# Patient Record
Sex: FEMALE | Race: WHITE | Employment: STUDENT | URBAN - METROPOLITAN AREA
[De-identification: names, ages, dates, MRNs, and addresses within clinical notes are randomized per-mention and may not be internally consistent; named-entity substitution may affect disease eponyms.]

---

## 2017-05-04 ENCOUNTER — OFFICE VISIT (OUTPATIENT)
Dept: URGENT CARE | Facility: URGENT CARE | Age: 27
End: 2017-05-04
Payer: COMMERCIAL

## 2017-05-04 VITALS
WEIGHT: 147 LBS | RESPIRATION RATE: 20 BRPM | HEART RATE: 92 BPM | OXYGEN SATURATION: 97 % | DIASTOLIC BLOOD PRESSURE: 80 MMHG | BODY MASS INDEX: 26.89 KG/M2 | SYSTOLIC BLOOD PRESSURE: 119 MMHG | TEMPERATURE: 100.2 F

## 2017-05-04 DIAGNOSIS — N10 ACUTE PYELONEPHRITIS: Primary | ICD-10-CM

## 2017-05-04 DIAGNOSIS — R30.0 DYSURIA: ICD-10-CM

## 2017-05-04 DIAGNOSIS — R82.90 NONSPECIFIC FINDING ON EXAMINATION OF URINE: ICD-10-CM

## 2017-05-04 LAB
ALBUMIN UR-MCNC: 30 MG/DL
APPEARANCE UR: ABNORMAL
BACTERIA #/AREA URNS HPF: ABNORMAL /HPF
BILIRUB UR QL STRIP: NEGATIVE
COLOR UR AUTO: YELLOW
GLUCOSE UR STRIP-MCNC: NEGATIVE MG/DL
HGB UR QL STRIP: ABNORMAL
KETONES UR STRIP-MCNC: 15 MG/DL
LEUKOCYTE ESTERASE UR QL STRIP: ABNORMAL
NITRATE UR QL: POSITIVE
NON-SQ EPI CELLS #/AREA URNS LPF: ABNORMAL /LPF
PH UR STRIP: 5.5 PH (ref 5–7)
RBC #/AREA URNS AUTO: ABNORMAL /HPF (ref 0–2)
SP GR UR STRIP: >1.03 (ref 1–1.03)
URN SPEC COLLECT METH UR: ABNORMAL
UROBILINOGEN UR STRIP-ACNC: 0.2 EU/DL (ref 0.2–1)
WBC #/AREA URNS AUTO: ABNORMAL /HPF (ref 0–2)

## 2017-05-04 PROCEDURE — 87086 URINE CULTURE/COLONY COUNT: CPT | Performed by: FAMILY MEDICINE

## 2017-05-04 PROCEDURE — 99203 OFFICE O/P NEW LOW 30 MIN: CPT | Mod: 25 | Performed by: FAMILY MEDICINE

## 2017-05-04 PROCEDURE — 87088 URINE BACTERIA CULTURE: CPT | Performed by: FAMILY MEDICINE

## 2017-05-04 PROCEDURE — 81001 URINALYSIS AUTO W/SCOPE: CPT

## 2017-05-04 PROCEDURE — 2894A VOIDCORRECT: CPT | Mod: 91 | Performed by: FAMILY MEDICINE

## 2017-05-04 PROCEDURE — 87186 SC STD MICRODIL/AGAR DIL: CPT | Performed by: FAMILY MEDICINE

## 2017-05-04 PROCEDURE — 96372 THER/PROPH/DIAG INJ SC/IM: CPT | Performed by: FAMILY MEDICINE

## 2017-05-04 RX ORDER — CEFTRIAXONE 1 G/1
1000 INJECTION, POWDER, FOR SOLUTION INTRAMUSCULAR; INTRAVENOUS ONCE
Qty: 10 ML | Refills: 0 | OUTPATIENT
Start: 2017-05-04 | End: 2017-05-04

## 2017-05-04 RX ORDER — CEFUROXIME AXETIL 500 MG/1
500 TABLET ORAL 2 TIMES DAILY
Qty: 20 TABLET | Refills: 0 | Status: SHIPPED | OUTPATIENT
Start: 2017-05-04

## 2017-05-04 NOTE — PATIENT INSTRUCTIONS
"  Kidney Infection (Adult, Female)    An infection of the kidney is also called \"pyelonephritis.\" It usually starts as a bladder infection (\"cystitis\") that spreads to the kidneys. Pyelonephritis is more serious than a bladder infection. It can cause severe illness if not treated properly.  The usual symptoms include an aching pain in the back, side, or lower abdomen. Other symptoms may include fever, chills, nausea, vomiting, blood in the urine, an urge to urinate, and a burning sensation when urinating. Treatment is usually oral antibiotics, or in more severe cases, intramuscular or IV antibiotics. These are started right away and may be changed once urine culture results determine the infecting organisms.  Home care  The following are general care guidelines:  1. Stay home from work or school. Rest in bed until your fever breaks and you are feeling better.  2. Drink lots of fluid (at least 6 to 8 glasses a day, unless you must restrict fluids for other medical reasons). This will force the medicine into your urinary system and flush the bacteria out of your body.  3. Avoid sex until you have finished all of your medicine and your symptoms are gone.  4. Avoid caffeine, alcohol, and spicy foods. These foods may irritate the kidney and bladder.  5. You may use acetaminophen or ibuprofen to control pain, unless another pain medicine was prescribed. [NOTE: If you have chronic liver or kidney disease or ever had a stomach ulcer or GI bleeding, talk with your doctor before using these medicines.]  Follow-up care  Follow up with your doctor or as advised by our staff for a repeat urine test in 10 days. This will ensure that your infection is fully cleared.  [NOTE: If you had an X-ray, CT scan, or other diagnostic test, it will be reviewed by a specialist. You will be notified of any new findings that may affect your care.]  When to seek medical care  Get prompt medical attention if any of the following occur:    Fever " over 100.4 F (38.0 C) after 48 hours of treatment    No improvement by the third day of treatment    Increasing back or abdominal pain    Repeated vomiting or inability to take oral medicine    Weakness, dizziness, or fainting    0506-5907 [employer]. [address] [place] [address], [place] [address]. All rights reserved. This information is not intended as a substitute for professional medical care. Always follow your healthcare professional's instructions.

## 2017-05-04 NOTE — MR AVS SNAPSHOT
"              After Visit Summary   5/4/2017    Lucie Merida    MRN: [id number]           Patient Information     Date Of Birth          1990        Visit Information        Provider Department      5/4/2017 6:00 PM Mandi Chavis MD [facility]        Today's Diagnoses     Acute pyelonephritis    -  1    Nonspecific finding on examination of urine        Dysuria          Care Instructions      Kidney Infection (Adult, Female)    An infection of the kidney is also called \"pyelonephritis.\" It usually starts as a bladder infection (\"cystitis\") that spreads to the kidneys. Pyelonephritis is more serious than a bladder infection. It can cause severe illness if not treated properly.  The usual symptoms include an aching pain in the back, side, or lower abdomen. Other symptoms may include fever, chills, nausea, vomiting, blood in the urine, an urge to urinate, and a burning sensation when urinating. Treatment is usually oral antibiotics, or in more severe cases, intramuscular or IV antibiotics. These are started right away and may be changed once urine culture results determine the infecting organisms.  Home care  The following are general care guidelines:  1. Stay home from work or school. Rest in bed until your fever breaks and you are feeling better.  2. Drink lots of fluid (at least 6 to 8 glasses a day, unless you must restrict fluids for other medical reasons). This will force the medicine into your urinary system and flush the bacteria out of your body.  3. Avoid sex until you have finished all of your medicine and your symptoms are gone.  4. Avoid caffeine, alcohol, and spicy foods. These foods may irritate the kidney and bladder.  5. You may use acetaminophen or ibuprofen to control pain, unless another pain medicine was prescribed. [NOTE: If you have chronic liver or kidney disease or ever had a stomach ulcer or GI bleeding, talk with your doctor before using these " medicines.]  Follow-up care  Follow up with your doctor or as advised by our staff for a repeat urine test in 10 days. This will ensure that your infection is fully cleared.  [NOTE: If you had an X-ray, CT scan, or other diagnostic test, it will be reviewed by a specialist. You will be notified of any new findings that may affect your care.]  When to seek medical care  Get prompt medical attention if any of the following occur:    Fever over 100.4 F (38.0 C) after 48 hours of treatment    No improvement by the third day of treatment    Increasing back or abdominal pain    Repeated vomiting or inability to take oral medicine    Weakness, dizziness, or fainting    9144-3686 [employer] [address] [place] [address], [place] [address]. All rights reserved. This information is not intended as a substitute for professional medical care. Always follow your healthcare professional's instructions.              Follow-ups after your visit        Follow-up notes from your care team     Return if symptoms worsen or fail to improve.      Who to contact     If you have questions or need follow up information about today's clinic visit or your schedule please contact [facility] directly at [phone].  Normal or non-critical lab and imaging results will be communicated to you by [employer], letter or phone within 4 business days after the clinic has received the results. If you do not hear from us within 7 days, please contact the clinic through [employer] or phone. If you have a critical or abnormal lab result, we will notify you by phone as soon as possible.  Submit refill requests through [employer] or call your pharmacy and they will forward the refill request to us. Please allow 3 business days for your refill to be completed.          Additional Information About Your Visit        [employer] Information     [employer] lets you send messages to your doctor, view your test results, renew your prescriptions,  "schedule appointments and more. To sign up, go to [email or web address] . Click on \"Log in\" on the left side of the screen, which will take you to the Welcome page. Then click on \"Sign up Now\" on the right side of the page.     You will be asked to enter the access code listed below, as well as some personal information. Please follow the directions to create your username and password.     Your access code is: [email or web address]  Expires: 2017  6:33 PM     Your access code will  in 90 days. If you need help or a new code, please call your [facility] clinic or [phone].        Care EveryWhere ID     This is your Care EveryWhere ID. This could be used by other organizations to access your [facility] medical records  [id number]        Your Vitals Were     Pulse Temperature Respirations Pulse Oximetry Breastfeeding? BMI (Body Mass Index)    92 100.2  F (37.9  C) (Oral) 20 97% No 26.89 kg/m2       Blood Pressure from Last 3 Encounters:   17 119/80   13 (!) 135/96   13 125/78    Weight from Last 3 Encounters:   17 147 lb (66.7 kg)   13 120 lb (54.4 kg)   13 120 lb (54.4 kg)              We Performed the Following     CEFTRIAXONE NA INJ /250MG     INJECTION INTRAMUSCULAR OR SUB-Q     UA reflex to Microscopic and Culture     Urine Culture Aerobic Bacterial     Urine Microscopic          Today's Medication Changes          These changes are accurate as of: 17  6:33 PM.  If you have any questions, ask your nurse or doctor.               Start taking these medicines.        Dose/Directions    cefTRIAXone 1 GM vial   Commonly known as:  ROCEPHIN   Used for:  Acute pyelonephritis   Started by:  Mandi Chavis MD        Dose:  1000 mg   Inject 1 g (1,000 mg) into the muscle once for 1 dose   Quantity:  10 mL   Refills:  0       cefuroxime 500 MG tablet   Commonly known as:  CEFTIN   Used for:  Acute pyelonephritis   Started by:  Mandi Chavis MD     "    Dose:  500 mg   Take 1 tablet (500 mg) by mouth 2 times daily   Quantity:  20 tablet   Refills:  0            Where to get your medicines      These medications were sent to [facility] - [place], [place] - [address]  [address], [place] [address]     Phone:  [phone]     cefuroxime 500 MG tablet         Some of these will need a paper prescription and others can be bought over the counter.  Ask your nurse if you have questions.     You don't need a prescription for these medications     cefTRIAXone 1 GM vial                Primary Care Provider    None       No address on file        Thank you!     Thank you for choosing [facility]  for your care. Our goal is always to provide you with excellent care. Hearing back from our patients is one way we can continue to improve our services. Please take a few minutes to complete the written survey that you may receive in the mail after your visit with us. Thank you!             Your Updated Medication List - Protect others around you: Learn how to safely use, store and throw away your medicines at www.disposemymeds.org.          This list is accurate as of: 5/4/17  6:33 PM.  Always use your most recent med list.                   Brand Name Dispense Instructions for use    cefTRIAXone 1 GM vial    ROCEPHIN    10 mL    Inject 1 g (1,000 mg) into the muscle once for 1 dose       cefuroxime 500 MG tablet    CEFTIN    20 tablet    Take 1 tablet (500 mg) by mouth 2 times daily       HYDROcodone-acetaminophen 5-325 MG per tablet    NORCO    12 tablet    Take 1-2 tablets by mouth every 4 hours as needed for pain.       ibuprofen 600 MG tablet    ADVIL/MOTRIN    20 tablet    Take 1 tablet by mouth every 8 hours as needed for pain.       Sulfacetamide Sodium-Sulfur 10-5 % Crea      Reported on 5/4/2017

## 2017-05-04 NOTE — NURSING NOTE
"Chief Complaint   Patient presents with     Urinary Pain     burning, stomach and vomiting     Back Pain     low back       Initial /80  Pulse 92  Temp 100.2  F (37.9  C) (Oral)  Resp 20  Wt 147 lb (66.7 kg)  SpO2 97%  Breastfeeding? No  BMI 26.89 kg/m2 Estimated body mass index is 26.89 kg/(m^2) as calculated from the following:    Height as of 2/24/13: 5' 2\" (1.575 m).    Weight as of this encounter: 147 lb (66.7 kg).  Medication Reconciliation: complete  Efrain Harrison CMA   "

## 2017-05-06 ENCOUNTER — OFFICE VISIT (OUTPATIENT)
Dept: URGENT CARE | Facility: URGENT CARE | Age: 27
End: 2017-05-06
Payer: COMMERCIAL

## 2017-05-06 VITALS
DIASTOLIC BLOOD PRESSURE: 80 MMHG | HEART RATE: 104 BPM | BODY MASS INDEX: 27.05 KG/M2 | WEIGHT: 147 LBS | RESPIRATION RATE: 16 BRPM | TEMPERATURE: 98.2 F | OXYGEN SATURATION: 100 % | SYSTOLIC BLOOD PRESSURE: 120 MMHG | HEIGHT: 62 IN

## 2017-05-06 DIAGNOSIS — N10 ACUTE PYELONEPHRITIS: Primary | ICD-10-CM

## 2017-05-06 PROCEDURE — 99214 OFFICE O/P EST MOD 30 MIN: CPT | Performed by: PHYSICIAN ASSISTANT

## 2017-05-06 RX ORDER — CIPROFLOXACIN 500 MG/1
500 TABLET, FILM COATED ORAL 2 TIMES DAILY
Qty: 20 TABLET | Refills: 0 | Status: SHIPPED | OUTPATIENT
Start: 2017-05-06

## 2017-05-06 NOTE — MR AVS SNAPSHOT
"              After Visit Summary   2017    Lucie Merida    MRN: [id number]           Patient Information     Date Of Birth          1990        Visit Information        Provider Department      2017 8:00 PM Roxie Moraes PA-C [facility]        Today's Diagnoses     Acute pyelonephritis    -  1      Care Instructions    We will contact you if your culture suggests that you need a different antibiotics.        Follow-ups after your visit        Who to contact     If you have questions or need follow up information about today's clinic visit or your schedule please contact [facility] directly at [phone].  Normal or non-critical lab and imaging results will be communicated to you by MyChart, letter or phone within 4 business days after the clinic has received the results. If you do not hear from us within 7 days, please contact the clinic through MyChart or phone. If you have a critical or abnormal lab result, we will notify you by phone as soon as possible.  Submit refill requests through [employer] or call your pharmacy and they will forward the refill request to us. Please allow 3 business days for your refill to be completed.          Additional Information About Your Visit        MyChart Information     [employer] lets you send messages to your doctor, view your test results, renew your prescriptions, schedule appointments and more. To sign up, go to [email or web address] . Click on \"Log in\" on the left side of the screen, which will take you to the Welcome page. Then click on \"Sign up Now\" on the right side of the page.     You will be asked to enter the access code listed below, as well as some personal information. Please follow the directions to create your username and password.     Your access code is: [email or web address]  Expires: 2017  6:33 PM     Your access code will  in 90 days. If you need help or a new code, please call your " "[facility] or [phone].        Care EveryWhere ID     This is your Care EveryWhere ID. This could be used by other organizations to access your [facility] medical records  [id number]        Your Vitals Were     Pulse Temperature Respirations Height Pulse Oximetry Breastfeeding?    104 98.2  F (36.8  C) (Oral) 16 5' 2\" (1.575 m) 100% No    BMI (Body Mass Index)                   26.89 kg/m2            Blood Pressure from Last 3 Encounters:   05/06/17 120/80   05/04/17 119/80   02/24/13 (!) 135/96    Weight from Last 3 Encounters:   05/06/17 147 lb (66.7 kg)   05/04/17 147 lb (66.7 kg)   02/24/13 120 lb (54.4 kg)              Today, you had the following     No orders found for display         Today's Medication Changes          These changes are accurate as of: 5/6/17  8:13 PM.  If you have any questions, ask your nurse or doctor.               Start taking these medicines.        Dose/Directions    ciprofloxacin 500 MG tablet   Commonly known as:  CIPRO   Used for:  Acute pyelonephritis   Started by:  Roxie Moraes PA-C        Dose:  500 mg   Take 1 tablet (500 mg) by mouth 2 times daily   Quantity:  20 tablet   Refills:  0            Where to get your medicines      These medications were sent to [id number] - [place], [place] - [address] AT [facility]  [address]  [facility] [address], [place] [address]    Hours:  24-hours Phone:  [phone]     ciprofloxacin 500 MG tablet                Primary Care Provider    None       No address on file        Thank you!     Thank you for choosing [facility]  for your care. Our goal is always to provide you with excellent care. Hearing back from our patients is one way we can continue to improve our services. Please take a few minutes to complete the written survey that you may receive in the mail after your visit with us. Thank you!             Your Updated Medication List - Protect others around you: " Learn how to safely use, store and throw away your medicines at www.disposemymeds.org.          This list is accurate as of: 5/6/17  8:13 PM.  Always use your most recent med list.                   Brand Name Dispense Instructions for use    cefuroxime 500 MG tablet    CEFTIN    20 tablet    Take 1 tablet (500 mg) by mouth 2 times daily       ciprofloxacin 500 MG tablet    CIPRO    20 tablet    Take 1 tablet (500 mg) by mouth 2 times daily       HYDROcodone-acetaminophen 5-325 MG per tablet    NORCO    12 tablet    Take 1-2 tablets by mouth every 4 hours as needed for pain.       ibuprofen 600 MG tablet    ADVIL/MOTRIN    20 tablet    Take 1 tablet by mouth every 8 hours as needed for pain.       Sulfacetamide Sodium-Sulfur 10-5 % Crea      Reported on 5/4/2017

## 2017-05-07 LAB
BACTERIA SPEC CULT: ABNORMAL
MICRO REPORT STATUS: ABNORMAL
MICROORGANISM SPEC CULT: ABNORMAL
MICROORGANISM SPEC CULT: ABNORMAL
SPECIMEN SOURCE: ABNORMAL

## 2017-05-07 NOTE — NURSING NOTE
"Chief Complaint   Patient presents with     Urgent Care     Kidney Problem     diagnosed with Kidney infection on Thursday, not getting better. Flank pain, worse on right side, abdominal pain and some nausea.        Initial /80  Pulse 104  Temp 98.2  F (36.8  C) (Oral)  Resp 16  Ht 5' 2\" (1.575 m)  Wt 147 lb (66.7 kg)  SpO2 100%  Breastfeeding? No  BMI 26.89 kg/m2 Estimated body mass index is 26.89 kg/(m^2) as calculated from the following:    Height as of this encounter: 5' 2\" (1.575 m).    Weight as of this encounter: 147 lb (66.7 kg).  Medication Reconciliation: complete  "

## 2017-05-07 NOTE — PROGRESS NOTES
SUBJECTIVE:                                                    Lucie Merida is a 26 year old female who presents to clinic today for the following health issues:      Follow-up pyelonephritis - not improving      Duration: Patient was seen 2 days ago and put on ceftin for pyelonephritis.      Description (location/character/radiation): She states her pain, frequency, and cramping have not improved in the last two days    Intensity:  moderate    Accompanying signs and symptoms: Denies fever, blood in urine, nausea or vomiting    History (similar episodes/previous evaluation): None    Precipitating or alleviating factors: None    Therapies tried and outcome: Taking ceftin as prescribed         Problem list and histories reviewed & adjusted, as indicated.  Additional history: as documented    There is no problem list on file for this patient.    Past Surgical History:   Procedure Laterality Date     ------------OTHER-------------  2010    breast reduction surgery     HERNIA REPAIR  1998     TONSILLECTOMY  2009       Social History   Substance Use Topics     Smoking status: Never Smoker     Smokeless tobacco: Never Used     Alcohol use Yes      Comment: Weekend only alcohol use     Family History   Problem Relation Age of Onset     Prostate Cancer Paternal Grandfather          Current Outpatient Prescriptions   Medication Sig Dispense Refill     ciprofloxacin (CIPRO) 500 MG tablet Take 1 tablet (500 mg) by mouth 2 times daily 20 tablet 0     cefuroxime (CEFTIN) 500 MG tablet Take 1 tablet (500 mg) by mouth 2 times daily 20 tablet 0     ibuprofen (ADVIL,MOTRIN) 600 MG tablet Take 1 tablet by mouth every 8 hours as needed for pain. (Patient not taking: Reported on 5/4/2017) 20 tablet 0     HYDROcodone-acetaminophen 5-325 MG per tablet Take 1-2 tablets by mouth every 4 hours as needed for pain. (Patient not taking: Reported on 5/4/2017) 12 tablet 0     Sulfacetamide Sodium-Sulfur 10-5 % CREA Reported on 5/4/2017    "    Allergies   Allergen Reactions     Latex      Burning sensation from condoms     BP Readings from Last 3 Encounters:   05/06/17 120/80   05/04/17 119/80   02/24/13 (!) 135/96    Wt Readings from Last 3 Encounters:   05/06/17 147 lb (66.7 kg)   05/04/17 147 lb (66.7 kg)   02/24/13 120 lb (54.4 kg)                    Reviewed and updated as needed this visit by clinical staff  Tobacco  Allergies  Meds       Reviewed and updated as needed this visit by Provider         ROS:  Constitutional, HEENT, cardiovascular, pulmonary, gi and gu systems are negative, except as otherwise noted.    OBJECTIVE:                                                    /80  Pulse 104  Temp 98.2  F (36.8  C) (Oral)  Resp 16  Ht 5' 2\" (1.575 m)  Wt 147 lb (66.7 kg)  SpO2 100%  Breastfeeding? No  BMI 26.89 kg/m2  Body mass index is 26.89 kg/(m^2).  GENERAL: healthy, alert and no distress  ABDOMEN: patient has CVA tenderness on right side, none on left.  Otherwise soft, nontender, no hepatosplenomegaly, no masses and bowel sounds normal  MS: no gross musculoskeletal defects noted, no edema    Diagnostic Test Results:  none - patient's original urine culture with sensitivities still pending     ASSESSMENT/PLAN:                                                        ICD-10-CM    1. Acute pyelonephritis N10 ciprofloxacin (CIPRO) 500 MG tablet       Switched antibiotic.  See patient instructions, The patient indicates understanding of these issues and agrees with the plan.      Roxie Moraes PA-C  [facility]    "

## 2021-07-06 NOTE — PROGRESS NOTES
SUBJECTIVE: Lucie Merida is a 26 year old female who complains of urinary frequency, urgency and dysuria x 2 days, with onset of flank pain, fever and vomiting x 2 today.  No abnormal vaginal discharge or bleeding.   Using the Mirena IUD.  Has h/o recurrent UTIs in the past.      OBJECTIVE: /80  Pulse 92  Temp 100.2  F (37.9  C) (Oral)  Resp 20  Wt 147 lb (66.7 kg)  SpO2 97%  Breastfeeding? No  BMI 26.89 kg/m2   Appears pale but in no apparent distress.   The abdomen is soft with suprapubic tenderness but no guarding, mass, rebound or organomegaly. Bilateral CVA tenderness noted.     Labs:   Results for orders placed or performed in visit on 05/04/17   UA reflex to Microscopic and Culture   Result Value Ref Range    Color Urine Yellow     Appearance Urine Slightly Cloudy     Glucose Urine Negative NEG mg/dL    Bilirubin Urine Negative NEG    Ketones Urine 15 (A) NEG mg/dL    Specific Gravity Urine >1.030 1.003 - 1.035    Blood Urine Moderate (A) NEG    pH Urine 5.5 5.0 - 7.0 pH    Protein Albumin Urine 30 (A) NEG mg/dL    Urobilinogen Urine 0.2 0.2 - 1.0 EU/dL    Nitrite Urine Positive (A) NEG    Leukocyte Esterase Urine Small (A) NEG    Source Midstream Urine    Urine Microscopic   Result Value Ref Range    WBC Urine 2-5 (A) 0 - 2 /HPF    RBC Urine 10-25 (A) 0 - 2 /HPF    Squamous Epithelial /LPF Urine Moderate (A) FEW /LPF    Bacteria Urine Many (A) NEG /HPF      ASSESSMENT: Acute pyelonephritis    PLAN: Treatment per orders - also push fluids, may use Pyridium OTC prn.  Declined anti-nausea medication tonight.  Call or return to clinic prn if these symptoms worsen or fail to improve as anticipated.  Mandi Au MD    Yes